# Patient Record
Sex: FEMALE | Race: ASIAN | NOT HISPANIC OR LATINO | URBAN - METROPOLITAN AREA
[De-identification: names, ages, dates, MRNs, and addresses within clinical notes are randomized per-mention and may not be internally consistent; named-entity substitution may affect disease eponyms.]

---

## 2021-02-16 ENCOUNTER — OFFICE VISIT (OUTPATIENT)
Dept: URBAN - METROPOLITAN AREA CLINIC 23 | Facility: CLINIC | Age: 56
End: 2021-02-16
Payer: COMMERCIAL

## 2021-02-16 DIAGNOSIS — E66.9 OBESITY (BMI 35.0-39.9 WITHOUT COMORBIDITY): ICD-10-CM

## 2021-02-16 DIAGNOSIS — R74.8 ELEVATED LIVER ENZYMES: ICD-10-CM

## 2021-02-16 DIAGNOSIS — E88.81 METABOLIC SYNDROME X: ICD-10-CM

## 2021-02-16 PROCEDURE — G9622 NO UNHEAL ETOH USER: HCPCS | Performed by: INTERNAL MEDICINE

## 2021-02-16 PROCEDURE — G8417 CALC BMI ABV UP PARAM F/U: HCPCS | Performed by: INTERNAL MEDICINE

## 2021-02-16 PROCEDURE — 99204 OFFICE O/P NEW MOD 45 MIN: CPT | Performed by: INTERNAL MEDICINE

## 2021-02-16 PROCEDURE — 3017F COLORECTAL CA SCREEN DOC REV: CPT | Performed by: INTERNAL MEDICINE

## 2021-02-16 PROCEDURE — 1036F TOBACCO NON-USER: CPT | Performed by: INTERNAL MEDICINE

## 2021-02-16 PROCEDURE — 99244 OFF/OP CNSLTJ NEW/EST MOD 40: CPT | Performed by: INTERNAL MEDICINE

## 2021-02-16 PROCEDURE — G8427 DOCREV CUR MEDS BY ELIG CLIN: HCPCS | Performed by: INTERNAL MEDICINE

## 2021-02-16 NOTE — PREVIOUS WORKUP REVIEWED
: ENDOSCOPIES:  LABS:  -Labs 02/16/2021: iron situation 34%, ceruloplasmin 29.8, hep B surface antibody positive, alpha 1 antitrypsin 132 MM, ANCA panel negative, CECY negative, anti-smooth muscle antibody negative, antimitochondrial antibody negative, hep B core antibody positive, IgG 1270. -Labs 2/9/2021:Total bilirubin 0.7, alkaline phosphatase 68, AST 56 H, ALT 47 H, BUN 17, creatinine 0.89, hemoglobin A1c 6.8 H, hep A IgM negative, hep B surface antigen negative, hep B core antibody IgM negative, hep C antibody negative. IMAGES: -Abdominal ultrasound 01/05/2021: to deliver infiltration. No hepatic mass. Normal biliary duct. Normal gallbladder without stones.

## 2021-02-16 NOTE — HPI-TODAY'S VISIT:
The patient was referred by Vin Joy_____for  elevated liver enzymes_____ .   A copy of this document is being forwarded to the referring provider.

## 2021-02-16 NOTE — HPI-TODAY'S VISIT:
65-year-old [place] female referred by Vin Joy for elevated liver enzymes.   This is the first time she was told her liver enzymes elevated.  No previous liver disease.  No family history of liver disease either.  Recently she was diagnosed with diabetes and hyperlipidemia.  Gained 20 pounds.  Heaviest body weight in her life, 220 pounds. Denies use of over-the-counter supplements or herbal medication.  No alcohol use. She had ultrasound with referring provider.  Acute viral hepatitis panel was negative.

## 2021-02-28 LAB
ACTIN (SMOOTH MUSCLE) ANTIBODY: 5
ALPHA-1-ANTITRYPSIN, SERUM: 132
ANA DIRECT: NEGATIVE
ANTIMYELOPEROXIDASE (MPO) ABS: <9
ANTIPROTEINASE 3 (PR-3) ABS: <3.5
ATYPICAL PANCA: (no result)
CERULOPLASMIN: 29.8
CYTOPLASMIC (C-ANCA): (no result)
HEP B CORE AB, TOT: POSITIVE
HEP B SURFACE AB, QUAL: REACTIVE
IGG, IMMUNOGLOBULIN G (RDL): 1270
IRON BIND.CAP.(TIBC): 306
IRON SATURATION: 34
IRON: 104
MITOCHONDRIAL (M2) ANTIBODY: <20
PERINUCLEAR (P-ANCA): (no result)
PHENOTYPE (PI): (no result)
UIBC: 202

## 2021-03-01 ENCOUNTER — TELEPHONE ENCOUNTER (OUTPATIENT)
Dept: URBAN - METROPOLITAN AREA CLINIC 23 | Facility: CLINIC | Age: 56
End: 2021-03-01

## 2021-03-27 ENCOUNTER — DASHBOARD ENCOUNTERS (OUTPATIENT)
Age: 56
End: 2021-03-27

## 2021-03-27 PROBLEM — 237602007: Status: ACTIVE | Noted: 2021-02-16

## 2021-03-27 PROBLEM — 162864005: Status: ACTIVE | Noted: 2021-02-16

## 2021-03-27 PROBLEM — 707724006: Status: ACTIVE | Noted: 2021-03-01

## 2022-11-23 NOTE — PHYSICAL EXAM CONSTITUTIONAL:
well developed, well nourished , in no acute distress , ambulating without difficulty , normal communication ability Significant decrease of oral intake greater than 3 days prior to admission